# Patient Record
(demographics unavailable — no encounter records)

---

## 2025-06-18 NOTE — ADDENDUM
[FreeTextEntry1] : I, Harriet Lopes, acted solely as a scribe for Dr. Hussain Boothe on this date 06/18/2025.

## 2025-06-18 NOTE — HISTORY OF PRESENT ILLNESS
[de-identified] : 84 y/o male presents for follow up for left upper back T1aN0 melanoma (0.7 mm, mitotic rate 1/mm^2, no ulceration/regression). He is s/p wide excision left upper back melanoma with left axillary sentinel node and non-sentinel node biopsy on 6/18/18. Pathology revealed no residual melanoma and negative lymph nodes. Melanoma decision dx 4/2018: Class 2B He follows up with Dr. Elmo Olivia of dermatology.   10/08/24:  wnl 10/08/24 CXR - No interval change. No radiographic evidence of active chest disease.  LDH 10/17/2023: 201 CXR 10/17/2023: negative  Chest x-ray (10/4/22): No radiographic evidence of active chest disease. LDH 10/4/22: 230 wnl  LDH 10/6/21: 232 LDH 10/5/20 - 166 wnl    Denies any new skin lesions, changes, or pruritic moles.  Denies constitutional symptoms.  Denies fever or chills.

## 2025-06-18 NOTE — ASSESSMENT
[FreeTextEntry1] : Stage 1 left upper back melanoma LAM  Normal LDH and chest x-ray 10/2024 Continue dermatologic surveillance Continue yearly blood work including LDH and chest x-ray October 2025 RTO 1 year

## 2025-06-18 NOTE — PHYSICAL EXAM
[Normal] : supple, no neck mass and thyroid not enlarged [Normal Neck Lymph Nodes] : normal neck lymph nodes  [Normal Supraclavicular Lymph Nodes] : normal supraclavicular lymph nodes [Normal Groin Lymph Nodes] : normal groin lymph nodes [Normal Axillary Lymph Nodes] : normal axillary lymph nodes [Normal] : oriented to person, place and time, with appropriate affect [de-identified] : Left upper back scar well healed. Multiple pigmented benign-appearing lesions on head, neck, trunk, and extremities. no adenopathy.